# Patient Record
Sex: FEMALE | Race: WHITE | URBAN - METROPOLITAN AREA
[De-identification: names, ages, dates, MRNs, and addresses within clinical notes are randomized per-mention and may not be internally consistent; named-entity substitution may affect disease eponyms.]

---

## 2022-09-22 LAB
ABO, EXTERNAL RESULT: NORMAL
C. TRACHOMATIS, EXTERNAL RESULT: NEGATIVE
GBS, EXTERNAL RESULT: POSITIVE
HEP B, EXTERNAL RESULT: NEGATIVE
HIV, EXTERNAL RESULT: NORMAL
N. GONORRHOEAE, EXTERNAL RESULT: NEGATIVE
RH FACTOR, EXTERNAL RESULT: POSITIVE
RPR, EXTERNAL RESULT: NEGATIVE
RUBELLA TITER, EXTERNAL RESULT: NORMAL

## 2023-03-10 ENCOUNTER — ANESTHESIA (OUTPATIENT)
Dept: LABOR AND DELIVERY | Age: 28
End: 2023-03-10
Payer: COMMERCIAL

## 2023-03-10 ENCOUNTER — ANESTHESIA EVENT (OUTPATIENT)
Dept: LABOR AND DELIVERY | Age: 28
End: 2023-03-10
Payer: COMMERCIAL

## 2023-03-10 ENCOUNTER — HOSPITAL ENCOUNTER (INPATIENT)
Age: 28
LOS: 2 days | Discharge: HOME | End: 2023-03-12
Attending: OBSTETRICS & GYNECOLOGY | Admitting: OBSTETRICS & GYNECOLOGY
Payer: COMMERCIAL

## 2023-03-10 ENCOUNTER — APPOINTMENT (OUTPATIENT)
Dept: LABOR AND DELIVERY | Age: 28
End: 2023-03-10
Payer: COMMERCIAL

## 2023-03-10 PROBLEM — Z34.90 ENCOUNTER FOR ELECTIVE INDUCTION OF LABOR: Status: ACTIVE | Noted: 2023-03-10

## 2023-03-10 LAB
ABO/RH: NORMAL
AMPHETAMINE SCREEN, URINE: NORMAL
ANTIBODY SCREEN: NORMAL
BARBITURATE SCREEN URINE: NORMAL
BENZODIAZEPINE SCREEN, URINE: NORMAL
BUPRENORPHINE URINE: NORMAL
CANNABINOID SCREEN URINE: NORMAL
COCAINE METABOLITE SCREEN URINE: NORMAL
FENTANYL SCREEN, URINE: NORMAL
HCT VFR BLD CALC: 35.5 % (ref 36–48)
HEMOGLOBIN: 11.9 G/DL (ref 12–16)
Lab: NORMAL
MCH RBC QN AUTO: 28.5 PG (ref 26–34)
MCHC RBC AUTO-ENTMCNC: 33.5 G/DL (ref 31–36)
MCV RBC AUTO: 85 FL (ref 80–100)
METHADONE SCREEN, URINE: NORMAL
OPIATE SCREEN URINE: NORMAL
OXYCODONE URINE: NORMAL
PDW BLD-RTO: 13.9 % (ref 12.4–15.4)
PH UA: 7
PHENCYCLIDINE SCREEN URINE: NORMAL
PLATELET # BLD: 211 K/UL (ref 135–450)
PMV BLD AUTO: 11 FL (ref 5–10.5)
RBC # BLD: 4.17 M/UL (ref 4–5.2)
TOTAL SYPHILLIS IGG/IGM: NORMAL
WBC # BLD: 15.9 K/UL (ref 4–11)

## 2023-03-10 PROCEDURE — 6370000000 HC RX 637 (ALT 250 FOR IP): Performed by: OBSTETRICS & GYNECOLOGY

## 2023-03-10 PROCEDURE — 3E033VJ INTRODUCTION OF OTHER HORMONE INTO PERIPHERAL VEIN, PERCUTANEOUS APPROACH: ICD-10-PCS | Performed by: OBSTETRICS & GYNECOLOGY

## 2023-03-10 PROCEDURE — 7200000001 HC VAGINAL DELIVERY

## 2023-03-10 PROCEDURE — 59025 FETAL NON-STRESS TEST: CPT

## 2023-03-10 PROCEDURE — 86900 BLOOD TYPING SEROLOGIC ABO: CPT

## 2023-03-10 PROCEDURE — 80307 DRUG TEST PRSMV CHEM ANLYZR: CPT

## 2023-03-10 PROCEDURE — 0KQM0ZZ REPAIR PERINEUM MUSCLE, OPEN APPROACH: ICD-10-PCS | Performed by: OBSTETRICS & GYNECOLOGY

## 2023-03-10 PROCEDURE — 51701 INSERT BLADDER CATHETER: CPT

## 2023-03-10 PROCEDURE — 2500000003 HC RX 250 WO HCPCS: Performed by: NURSE ANESTHETIST, CERTIFIED REGISTERED

## 2023-03-10 PROCEDURE — 86780 TREPONEMA PALLIDUM: CPT

## 2023-03-10 PROCEDURE — 10907ZC DRAINAGE OF AMNIOTIC FLUID, THERAPEUTIC FROM PRODUCTS OF CONCEPTION, VIA NATURAL OR ARTIFICIAL OPENING: ICD-10-PCS | Performed by: OBSTETRICS & GYNECOLOGY

## 2023-03-10 PROCEDURE — 85027 COMPLETE CBC AUTOMATED: CPT

## 2023-03-10 PROCEDURE — 2500000003 HC RX 250 WO HCPCS

## 2023-03-10 PROCEDURE — 86901 BLOOD TYPING SEROLOGIC RH(D): CPT

## 2023-03-10 PROCEDURE — 6360000002 HC RX W HCPCS: Performed by: OBSTETRICS & GYNECOLOGY

## 2023-03-10 PROCEDURE — 3700000025 EPIDURAL BLOCK: Performed by: ANESTHESIOLOGY

## 2023-03-10 PROCEDURE — 1220000000 HC SEMI PRIVATE OB R&B

## 2023-03-10 PROCEDURE — 2580000003 HC RX 258: Performed by: OBSTETRICS & GYNECOLOGY

## 2023-03-10 PROCEDURE — 86850 RBC ANTIBODY SCREEN: CPT

## 2023-03-10 RX ORDER — SODIUM CHLORIDE, SODIUM LACTATE, POTASSIUM CHLORIDE, CALCIUM CHLORIDE 600; 310; 30; 20 MG/100ML; MG/100ML; MG/100ML; MG/100ML
INJECTION, SOLUTION INTRAVENOUS CONTINUOUS
Status: DISCONTINUED | OUTPATIENT
Start: 2023-03-10 | End: 2023-03-12 | Stop reason: HOSPADM

## 2023-03-10 RX ORDER — ACETAMINOPHEN 500 MG
1000 TABLET ORAL EVERY 8 HOURS PRN
Status: DISCONTINUED | OUTPATIENT
Start: 2023-03-10 | End: 2023-03-12 | Stop reason: HOSPADM

## 2023-03-10 RX ORDER — SODIUM CHLORIDE, SODIUM LACTATE, POTASSIUM CHLORIDE, AND CALCIUM CHLORIDE .6; .31; .03; .02 G/100ML; G/100ML; G/100ML; G/100ML
500 INJECTION, SOLUTION INTRAVENOUS PRN
Status: DISCONTINUED | OUTPATIENT
Start: 2023-03-10 | End: 2023-03-10 | Stop reason: HOSPADM

## 2023-03-10 RX ORDER — FERROUS SULFATE 325(65) MG
325 TABLET ORAL
COMMUNITY

## 2023-03-10 RX ORDER — DOCUSATE SODIUM 100 MG/1
100 CAPSULE, LIQUID FILLED ORAL 2 TIMES DAILY
Status: DISCONTINUED | OUTPATIENT
Start: 2023-03-10 | End: 2023-03-12 | Stop reason: HOSPADM

## 2023-03-10 RX ORDER — ONDANSETRON 2 MG/ML
4 INJECTION INTRAMUSCULAR; INTRAVENOUS EVERY 6 HOURS PRN
Status: DISCONTINUED | OUTPATIENT
Start: 2023-03-10 | End: 2023-03-10 | Stop reason: HOSPADM

## 2023-03-10 RX ORDER — IBUPROFEN 600 MG/1
600 TABLET ORAL EVERY 8 HOURS PRN
Status: DISCONTINUED | OUTPATIENT
Start: 2023-03-10 | End: 2023-03-12 | Stop reason: HOSPADM

## 2023-03-10 RX ORDER — LANOLIN 100 %
OINTMENT (GRAM) TOPICAL PRN
Status: DISCONTINUED | OUTPATIENT
Start: 2023-03-10 | End: 2023-03-12 | Stop reason: HOSPADM

## 2023-03-10 RX ORDER — SODIUM CHLORIDE 0.9 % (FLUSH) 0.9 %
5-40 SYRINGE (ML) INJECTION EVERY 12 HOURS SCHEDULED
Status: DISCONTINUED | OUTPATIENT
Start: 2023-03-10 | End: 2023-03-10 | Stop reason: HOSPADM

## 2023-03-10 RX ORDER — SODIUM CHLORIDE 0.9 % (FLUSH) 0.9 %
5-40 SYRINGE (ML) INJECTION PRN
Status: DISCONTINUED | OUTPATIENT
Start: 2023-03-10 | End: 2023-03-10 | Stop reason: HOSPADM

## 2023-03-10 RX ORDER — SODIUM CHLORIDE, SODIUM LACTATE, POTASSIUM CHLORIDE, AND CALCIUM CHLORIDE .6; .31; .03; .02 G/100ML; G/100ML; G/100ML; G/100ML
1000 INJECTION, SOLUTION INTRAVENOUS PRN
Status: DISCONTINUED | OUTPATIENT
Start: 2023-03-10 | End: 2023-03-10 | Stop reason: HOSPADM

## 2023-03-10 RX ORDER — MISOPROSTOL 200 UG/1
800 TABLET ORAL PRN
Status: DISCONTINUED | OUTPATIENT
Start: 2023-03-10 | End: 2023-03-10 | Stop reason: HOSPADM

## 2023-03-10 RX ORDER — SODIUM CHLORIDE 9 MG/ML
INJECTION, SOLUTION INTRAVENOUS PRN
Status: DISCONTINUED | OUTPATIENT
Start: 2023-03-10 | End: 2023-03-12 | Stop reason: HOSPADM

## 2023-03-10 RX ORDER — CARBOPROST TROMETHAMINE 250 UG/ML
250 INJECTION, SOLUTION INTRAMUSCULAR PRN
Status: DISCONTINUED | OUTPATIENT
Start: 2023-03-10 | End: 2023-03-10 | Stop reason: HOSPADM

## 2023-03-10 RX ORDER — ACETAMINOPHEN 325 MG/1
650 TABLET ORAL EVERY 4 HOURS PRN
Status: DISCONTINUED | OUTPATIENT
Start: 2023-03-10 | End: 2023-03-10 | Stop reason: SDUPTHER

## 2023-03-10 RX ORDER — DOCUSATE SODIUM 100 MG/1
100 CAPSULE, LIQUID FILLED ORAL 2 TIMES DAILY
Status: DISCONTINUED | OUTPATIENT
Start: 2023-03-10 | End: 2023-03-10

## 2023-03-10 RX ORDER — LIDOCAINE HYDROCHLORIDE AND EPINEPHRINE 15; 5 MG/ML; UG/ML
INJECTION, SOLUTION EPIDURAL PRN
Status: DISCONTINUED | OUTPATIENT
Start: 2023-03-10 | End: 2023-03-10 | Stop reason: SDUPTHER

## 2023-03-10 RX ORDER — TERBUTALINE SULFATE 1 MG/ML
0.25 INJECTION, SOLUTION SUBCUTANEOUS
Status: DISCONTINUED | OUTPATIENT
Start: 2023-03-10 | End: 2023-03-10 | Stop reason: HOSPADM

## 2023-03-10 RX ORDER — NALOXONE HYDROCHLORIDE 0.4 MG/ML
INJECTION, SOLUTION INTRAMUSCULAR; INTRAVENOUS; SUBCUTANEOUS PRN
Status: DISCONTINUED | OUTPATIENT
Start: 2023-03-10 | End: 2023-03-10 | Stop reason: HOSPADM

## 2023-03-10 RX ORDER — SODIUM CHLORIDE 0.9 % (FLUSH) 0.9 %
5-40 SYRINGE (ML) INJECTION EVERY 12 HOURS SCHEDULED
Status: DISCONTINUED | OUTPATIENT
Start: 2023-03-10 | End: 2023-03-12 | Stop reason: HOSPADM

## 2023-03-10 RX ORDER — OXYCODONE HYDROCHLORIDE 5 MG/1
5 TABLET ORAL EVERY 4 HOURS PRN
Status: DISCONTINUED | OUTPATIENT
Start: 2023-03-10 | End: 2023-03-12 | Stop reason: HOSPADM

## 2023-03-10 RX ORDER — SODIUM CHLORIDE 0.9 % (FLUSH) 0.9 %
5-40 SYRINGE (ML) INJECTION PRN
Status: DISCONTINUED | OUTPATIENT
Start: 2023-03-10 | End: 2023-03-12 | Stop reason: HOSPADM

## 2023-03-10 RX ORDER — SODIUM CHLORIDE 9 MG/ML
25 INJECTION, SOLUTION INTRAVENOUS PRN
Status: DISCONTINUED | OUTPATIENT
Start: 2023-03-10 | End: 2023-03-10 | Stop reason: HOSPADM

## 2023-03-10 RX ORDER — METHYLERGONOVINE MALEATE 0.2 MG/ML
200 INJECTION INTRAVENOUS PRN
Status: DISCONTINUED | OUTPATIENT
Start: 2023-03-10 | End: 2023-03-10 | Stop reason: HOSPADM

## 2023-03-10 RX ADMIN — DEXTROSE MONOHYDRATE 5 MILLION UNITS: 5 INJECTION INTRAVENOUS at 04:49

## 2023-03-10 RX ADMIN — Medication 10 UNITS: at 11:33

## 2023-03-10 RX ADMIN — IBUPROFEN 600 MG: 600 TABLET, FILM COATED ORAL at 14:11

## 2023-03-10 RX ADMIN — SODIUM CHLORIDE, POTASSIUM CHLORIDE, SODIUM LACTATE AND CALCIUM CHLORIDE: 600; 310; 30; 20 INJECTION, SOLUTION INTRAVENOUS at 09:16

## 2023-03-10 RX ADMIN — Medication 2.5 MILLION UNITS: at 08:52

## 2023-03-10 RX ADMIN — Medication 1 MILLI-UNITS/MIN: at 04:34

## 2023-03-10 RX ADMIN — LIDOCAINE HYDROCHLORIDE AND EPINEPHRINE 2 ML: 15; 5 INJECTION, SOLUTION EPIDURAL at 09:42

## 2023-03-10 RX ADMIN — IBUPROFEN 600 MG: 600 TABLET, FILM COATED ORAL at 23:39

## 2023-03-10 RX ADMIN — Medication 909.1 MILLI-UNITS/MIN: at 11:33

## 2023-03-10 RX ADMIN — LIDOCAINE HYDROCHLORIDE AND EPINEPHRINE 3 ML: 15; 5 INJECTION, SOLUTION EPIDURAL at 09:38

## 2023-03-10 RX ADMIN — DOCUSATE SODIUM 100 MG: 100 CAPSULE, LIQUID FILLED ORAL at 20:59

## 2023-03-10 ASSESSMENT — PAIN SCALES - GENERAL
PAINLEVEL_OUTOF10: 4
PAINLEVEL_OUTOF10: 0
PAINLEVEL_OUTOF10: 2

## 2023-03-10 ASSESSMENT — PAIN - FUNCTIONAL ASSESSMENT
PAIN_FUNCTIONAL_ASSESSMENT: ACTIVITIES ARE NOT PREVENTED
PAIN_FUNCTIONAL_ASSESSMENT: ACTIVITIES ARE NOT PREVENTED

## 2023-03-10 ASSESSMENT — ENCOUNTER SYMPTOMS: SHORTNESS OF BREATH: 0

## 2023-03-10 ASSESSMENT — PAIN DESCRIPTION - DESCRIPTORS
DESCRIPTORS: ACHING
DESCRIPTORS: CRAMPING

## 2023-03-10 ASSESSMENT — PAIN DESCRIPTION - ORIENTATION
ORIENTATION: LOWER
ORIENTATION: MID

## 2023-03-10 ASSESSMENT — PAIN DESCRIPTION - LOCATION
LOCATION: ABDOMEN
LOCATION: VAGINA

## 2023-03-10 NOTE — FLOWSHEET NOTE
Edmar Shields CRNA en route to bedside for epidural insertion. Pt able to ambulate to the restroom and empty bladder. Pt now is sitting up on edge of bed for insertion. IV fluid bolus is running. RN remains at bedside.

## 2023-03-10 NOTE — L&D DELIVERY SUMMARY NOTE
Department of Obstetrics and Gynecology  Spontaneous Vaginal Delivery Note      Pre-operative Diagnosis:  Term pregnancy and Induced labor    Post-operative Diagnosis:  Living  infant(s) and Male    Information for the patient's :  Mohsen Murillo [[id number]]                  Infant Wt:   Information for the patient's :  Isa Buerger [[id number]]         APGARS:     Information for the patient's :  Isa Buerger [[id number]]         Anesthesia:  epidural anesthesia    Application and Delivery:  27 yo  at 44 5/7 weeks, IOL at term. Progressed well to complete.  after 20 minutes pushing over small 2nd degree lac. Viable male, skin to skin, delayed cord clamp. Placenta spont intact, uterus explored. Repair with 3-0 vicryl.  ml        Intake/Output:     Date 23 - 03/10/23 0700(Not Admitted) 03/10/23 07 - 23 0700   Shift 8560-9092 5720-8374 24 Hour Total 0289-0367 0280-0751 24 Hour Total   INTAKE   I.V.  0.5 0.5 652.3  652.3   Shift Total  0.5 0.5 652.3  652.3   OUTPUT   Urine    725  725   Shift Total    725  725   NET  0.5 0.5 -72.7  -72.7       Condition:  infant stable to general nursery and mother stable    Blood Type and Rh: B POS        Rubella Immunity Status:   Unknown           Infant Feeding:    breast    Attending Attestation: I performed the procedure.

## 2023-03-10 NOTE — FLOWSHEET NOTE
RN at bedside to adjust External US monitor. Pt is requesting epidural at this time. Call placed to YEN Chapa CRNA to make aware. CRNA is in another room at this time and states that she will come to bedside when able. Pt able to ambulate to the restroom and voided. Rn remains at bedside. Pt is now standing at the edge of the bed and is rocking with contractions. External US adjusted multiple times but is not consistently tracing Fetal HR due to Pt movement. Rn audibly notes External US tracing maternal HR at 8436-3983. Rn adjusted External HR and notes FHR to be at a baseline of ~135 BPM. RN remains at bedside. Will continue to monitor.

## 2023-03-10 NOTE — FLOWSHEET NOTE
Head to toe assessment performed by this RN. VSS. Patient denies headache, blurred vision, dizziness, epigastric pain, nausea and vomiting or shortness of breath. Mild edema noted bilaterally in LE. Adequate urine output. whiteboard updated, oriented to room, Plan of care discussed with patient, questions and concerns addressed by this RN. RN to inform MD of assessment findings.

## 2023-03-10 NOTE — ANESTHESIA PROCEDURE NOTES
Epidural Block    Patient location during procedure: OB  Start time: 3/10/2023 9:30 AM  End time: 3/10/2023 9:54 AM  Reason for block: labor epidural  Staffing  Performed: resident/CRNA   Anesthesiologist: Jimmy Adamson MD  Resident/CRNA: CLIF Wright - CRNA  Epidural  Patient position: sitting  Prep: ChloraPrep and site prepped and draped  Patient monitoring: continuous pulse ox and frequent blood pressure checks  Approach: midline  Location: L3-4  Injection technique: CAMILLE saline  Provider prep: mask and sterile gloves  Needle  Needle type: Tuohy   Needle gauge: 17 G  Needle length: 3.5 in  Needle insertion depth: 8 cm  Catheter type: multi-orifice  Catheter size: 19 G  Catheter at skin depth: 13 cm  Test dose: negativeCatheter Secured: tegaderm and tape  Assessment  Sensory level: T10  Hemodynamics: stable  Attempts: 1  Outcomes: uncomplicated and patient tolerated procedure well  Additional Notes  Consent:  Risks and benefits of the labor epidural were discussed and the patient was given the opportunity to ask questions. Informed consent was obtained. Timeout:  A \"time out\" was performed immediately prior to the start of the epidural procedure. The patient, site, procedure and provider were correctly identified. Allergies were reviewed. All members of the team and the patient participated in the time out. Procedure  Skin wheal with Lidocaine 1% 2 mL @ L3-L4. Loss of resistance with 2 mL of normal saline to expand the epidural space. denies paresthesia. CSF  negative, Blood: negative. Test dose negative with (3ml 1. 5%Lidocaine with 1:200,000 Epinephrine)  Occlusive dressing; steri strips, tegaderm and tape applied using aseptic technique. Attempts: 1   Difficulties/Complications: None    The patient was returned to the supine position with her head of bed elevated 30 degrees and right uterine displacement. She tolerated the epidural placement and dosing well.  RN remains at bedside to monitor patient.       CLIF Lai CRNA  11:28 AM       Preanesthetic Checklist  Completed: patient identified, IV checked, site marked, risks and benefits discussed, surgical/procedural consents, equipment checked, pre-op evaluation, timeout performed, anesthesia consent given, oxygen available, monitors applied/VS acknowledged, fire risk safety assessment completed and verbalized and blood product R/B/A discussed and consented

## 2023-03-10 NOTE — PLAN OF CARE
Problem: Postpartum  Goal: Experiences normal postpartum course  Description:  Postpartum OB-Pregnancy care plan goal which identifies if the mother is experiencing a normal postpartum course  3/10/2023 135 by Guero Bermudez RN  Outcome: Progressing  3/10/2023 1352 by Guero Bermudez RN  Outcome: Progressing  Flowsheets (Taken 3/10/2023 0425 by Chika Nino RN)  Experiences Normal Postpartum Course:   Monitor maternal vital signs   Assess uterine involution  Goal: Appropriate maternal -  bonding  Description:  Postpartum OB-Pregnancy care plan goal which identifies if the mother and  are bonding appropriately  3/10/2023 135 by Guero Bermudez RN  Outcome: Progressing  3/10/2023 1352 by Guero Bermudez RN  Outcome: Progressing  Goal: Establishment of infant feeding pattern  Description:  Postpartum OB-Pregnancy care plan goal which identifies if the mother is establishing a feeding pattern with their   Outcome: Progressing  Flowsheets (Taken 3/10/2023 0425 by Chika Nino RN)  Establishment of Infant Feeding Pattern:   Assess breast/bottle feeding   Refer to lactation as needed  Goal: Incisions, wounds, or drain sites healing without S/S of infection  3/10/2023 1352 by Guero Bermudez RN  Outcome: Progressing  3/10/2023 1352 by Guero Bermudez RN  Outcome: Progressing  [address] (Taken 3/10/2023 0425 by Chika Nino RN)  Incisions, Wounds, or Drain Sites Healing Without Sign and Symptoms of Infection:   ADMISSION and DAILY: Assess and document risk factors for pressure ulcer development   TWICE DAILY: Assess and document skin integrity   TWICE DAILY: Assess and document dressing/incision, wound bed, drain sites and surrounding tissue     Problem: Pain  Goal: Verbalizes/displays adequate comfort level or baseline comfort level  3/10/2023 1352 by Guero Bermudez RN  Outcome: Progressing  3/10/2023 1352 by Guero Bermudez RN  Outcome: Progressing  [address] (Taken 3/10/2023 0425 by Everardo Carlton Shelby Antonio RN)  Verbalizes/displays adequate comfort level or baseline comfort level:   Encourage patient to monitor pain and request assistance   Assess pain using appropriate pain scale     Problem: Infection - Adult  Goal: Absence of infection at discharge  3/10/2023 1352 by Jayson Hardy RN  Outcome: Progressing  3/10/2023 1352 by Jayson Hardy RN  Outcome: Progressing  Flowsheets (Taken 3/10/2023 0425 by Nya Amanda RN)  Absence of infection at discharge:   Assess and monitor for signs and symptoms of infection   Monitor lab/diagnostic results   Monitor all insertion sites i.e., indwelling lines, tubes and drains  Goal: Absence of infection during hospitalization  3/10/2023 1352 by Jayson Hardy RN  Outcome: Progressing  3/10/2023 1352 by Jayson Hardy RN  Outcome: Progressing  Flowsheets (Taken 3/10/2023 0425 by Nya Amanda RN)  Absence of infection during hospitalization:   Assess and monitor for signs and symptoms of infection   Monitor lab/diagnostic results   Monitor all insertion sites i.e., indwelling lines, tubes and drains  Goal: Absence of fever/infection during anticipated neutropenic period  3/10/2023 1352 by Jayson Hardy RN  Outcome: Progressing  3/10/2023 1352 by Jayson Hardy RN  Outcome: Progressing  Flowsheets (Taken 3/10/2023 0425 by Nya Amanda RN)  Absence of fever/infection during anticipated neutropenic period:   Monitor white blood cell count   Administer growth factors as ordered     Problem: Safety - Adult  Goal: Free from fall injury  3/10/2023 1352 by Jayson Hardy RN  Outcome: Progressing  3/10/2023 1352 by Jayson Hardy RN  Outcome: Progressing     Problem: Discharge Planning  Goal: Discharge to home or other facility with appropriate resources  3/10/2023 1352 by Jayson Hardy RN  Outcome: Progressing  3/10/2023 1352 by Jayson Hardy RN  Outcome: Progressing  Flowsheets (Taken 3/10/2023 0425 by Nya Amanda RN)  Discharge to home or other facility with appropriate resources:   Identify barriers to discharge with patient and caregiver   Arrange for needed discharge resources and transportation as appropriate     Problem: Chronic Conditions and Co-morbidities  Goal: Patient's chronic conditions and co-morbidity symptoms are monitored and maintained or improved  3/10/2023 1352 by Aisha Maier RN  Outcome: Progressing  3/10/2023 1352 by Aisha Maier RN  Outcome: Progressing  Flowsheets (Taken 3/10/2023 0425 by Connie Jerome RN)  Care Plan - Patient's Chronic Conditions and Co-Morbidity Symptoms are Monitored and Maintained or Improved:   Monitor and assess patient's chronic conditions and comorbid symptoms for stability, deterioration, or improvement   Collaborate with multidisciplinary team to address chronic and comorbid conditions and prevent exacerbation or deterioration

## 2023-03-10 NOTE — FLOWSHEET NOTE
Pt admitted to [facility] for induction of labor. Pt and family oriented to room, call light, and binder. Whiteboard updated, gown and urine collection container given. Pt aware of urine drug testing and signed written consent. EFM applied with consent to central monitor bank with alarms on. Uterus soft and non-tender. Pt reports fetal movement. Pt denies vaginal leakage of fluid or bleeding. IV started, infusing w/o difficulty, labs sent. Admission history obtained and appropriate consents reviewed and signed.

## 2023-03-10 NOTE — H&P
Department of Obstetrics and Gynecology   Obstetrics History and Physical        CHIEF COMPLAINT:  induction    HISTORY OF PRESENT ILLNESS:    Emma Gastelum  is a 28 y.o.  female at 38w11d presents with a chief complaint as above and is being admitted for induction    Estimated Due Date: Estimated Date of Delivery: 3/12/23    PRENATAL CARE: Complicated by: none    PAST OB HISTORY:  OB History          1    Para        Term                AB        Living             SAB        IAB        Ectopic        Molar        Multiple        Live Births                  Past Medical History:        Diagnosis Date    Anemia      Past Surgical History:        Procedure Laterality Date    HAND SURGERY Right 2014    3 screws    TONSILLECTOMY  2008     Allergies:  Patient has no known allergies.   Social History:    Social History     Socioeconomic History    Marital status:      Spouse name: Not on file    Number of children: Not on file    Years of education: Not on file    Highest education level: Not on file   Occupational History    Not on file   Tobacco Use    Smoking status: Never    Smokeless tobacco: Never   Substance and Sexual Activity    Alcohol use: Not Currently    Drug use: Never    Sexual activity: Yes     Partners: Male   Other Topics Concern    Not on file   Social History Narrative    Not on file     Social Determinants of Health     Financial Resource Strain: Not on file   Food Insecurity: Not on file   Transportation Needs: Not on file   Physical Activity: Not on file   Stress: Not on file   Social Connections: Not on file   Intimate Partner Violence: Not on file   Housing Stability: Not on file     Family History:       Problem Relation Age of Onset    Breast Cancer Mother      Medications Prior to Admission:  Medications Prior to Admission: Prenatal Vit w/Yx-Spdqmwcst-YA (PNV PO), Take by mouth  ferrous sulfate (IRON 325) 325 (65 Fe) MG tablet, Take 325 mg by mouth daily (with breakfast)    REVIEW OF SYSTEMS:  negative     PHYSICAL EXAM:    Vitals:    03/10/23 0328 03/10/23 0545 03/10/23 0726   BP: (!) 142/89 115/75 109/69   Pulse: 94 75 80   Resp: 20 18 16   Temp: 97.6 °F (36.4 °C)  97.7 °F (36.5 °C)   TempSrc: Oral  Oral   SpO2: 97% 98% 98%   Weight: 229 lb (103.9 kg)     Height: 5' 9\" (1.753 m)       General appearance:  awake, alert, cooperative, no apparent distress, and appears stated age  Neurologic:  Awake, alert, oriented to name, place and time. Lungs:  No increased work of breathing, good air exchange  Abdomen:  Soft, non tender, gravid, fundal height consistent with the gestational age, EFW by Leopold's maneuvers is 7 lbs. ,  8 oz. Pelvis:  Adequate pelvis  Cervix: 1 cm  Contraction frequency: none  Membranes:  Intact  Labs: CBC:   Lab Results   Component Value Date/Time    WBC 15.9 03/10/2023 04:05 AM    RBC 4.17 03/10/2023 04:05 AM    HGB 11.9 03/10/2023 04:05 AM    HCT 35.5 03/10/2023 04:05 AM    MCV 85.0 03/10/2023 04:05 AM    MCH 28.5 03/10/2023 04:05 AM    MCHC 33.5 03/10/2023 04:05 AM    RDW 13.9 03/10/2023 04:05 AM     03/10/2023 04:05 AM    MPV 11.0 03/10/2023 04:05 AM       ASSESSMENT:  Encounter for elective induction of labor    PLAN: Admit, pitocin  Labor: Routine labor orders  Fetus: Reassuring  GBS: Positive    I have presented reasonable alternatives to the patient's proposed care, treatment, and services. The discussion I have done encompassed risks, benefits, and side effects related to the alternatives and the risks related to not receiving the proposed care, treatment, and services. All questions answered. Patient wishes to proceed. The surgical site was confirmed by the patient and me.       Electronically signed by Kaleb Elizalde MD on 3/10/2023 at 8:06 AM

## 2023-03-10 NOTE — FLOWSHEET NOTE
Dr. Gloria Wing at bedside to discuss POC with Pt. Pt is agreeable to AROM if possible. SVE performed per MD and AROM performed at 0716. A moderate amount of clear fluid was noted. Martina care performed and Pt assisted to the restroom. Pt denies any questions or needs at this time. Will continue to monitor.

## 2023-03-10 NOTE — ANESTHESIA PRE PROCEDURE
Department of Anesthesiology  Preprocedure Note       Name:  Codey La   Age:  28 y.o.  :  1995                                          MRN:  [id number]         Date:  3/10/2023      Procedure: Labor Epidural    Medications prior to admission:   Prior to Admission medications    Medication Sig Start Date End Date Taking?  Authorizing Provider   Prenatal Vit w/Jh-Dgqepfxkv-NR (PNV PO) Take by mouth   Yes Historical Provider, MD   ferrous sulfate (IRON 325) 325 (65 Fe) MG tablet Take 325 mg by mouth daily (with breakfast)   Yes Historical Provider, MD       Current medications:    Current Facility-Administered Medications   Medication Dose Route Frequency Provider Last Rate Last Admin    lactated ringers IV soln infusion   IntraVENous Continuous Vargas Diop MD        lactated ringers bolus  500 mL IntraVENous PRN Vargas Diop MD        Or    lactated ringers bolus  1,000 mL IntraVENous PRN Vargas Diop MD        sodium chloride flush 0.9 % injection 5-40 mL  5-40 mL IntraVENous 2 times per day Vargas Diop MD        sodium chloride flush 0.9 % injection 5-40 mL  5-40 mL IntraVENous PRN Vargas Diop MD        0.9 % sodium chloride infusion  25 mL IntraVENous PRN Vargas Diop MD        ondansetron [facility]) injection 4 mg  4 mg IntraVENous Q6H PRN Vargas Diop MD        methylergonovine (METHERGINE) injection 200 mcg  200 mcg IntraMUSCular PRN Vargas Diop MD        carboprost [facility]) injection 250 mcg  250 mcg IntraMUSCular PRN Vargas Diop MD        miSOPROStol (CYTOTEC) tablet 800 mcg  800 mcg Rectal PRN Vargas Diop MD        tranexamic acid (CYCLOKAPRON) 1000 mg in sodium chloride 0.9 % 50 mL IVPB  1,000 mg IntraVENous Once PRN Vargas Diop MD        acetaminophen (TYLENOL) tablet 650 mg  650 mg Oral Q4H PRN Vargas Diop MD        benzocaine-menthol (DERMOPLAST) 20-0.5 % spray   Topical PRN Eun Lo MD        docusate sodium (COLACE) capsule 100 mg  100 mg Oral BID Eun Lo MD        oxytocin (PITOCIN) 30 units in 500 mL infusion  1-20 miguelito-units/min IntraVENous Continuous Eun Lo MD 8 mL/hr at 03/10/23 0902 8 miguelito-units/min at 03/10/23 0902    terbutaline (BRETHINE) injection 0.25 mg  0.25 mg SubCUTAneous Once PRN Eun Lo MD        witch hazel-glycerin (TUCKS) pad   Topical PRN Eun Lo MD        penicillin G potassium 2.5 million units in 0.9% sodium chloride 100 mL IVPB  2.5 Million Units IntraVENous Q4H Eun Lo MD   2.5 Million Units at 03/10/23 3401       Allergies:  No Known Allergies    Problem List:    Patient Active Problem List   Diagnosis Code    Encounter for elective induction of labor Z34.90       Past Medical History:        Diagnosis Date    Anemia        Past Surgical History:        Procedure Laterality Date    HAND SURGERY Right 2014    3 screws    TONSILLECTOMY  2008       Social History:    Social History     Tobacco Use    Smoking status: Never    Smokeless tobacco: Never   Substance Use Topics    Alcohol use: Not Currently                                Counseling given: Not Answered      Vital Signs (Current):   Vitals:    03/10/23 0328 03/10/23 0545 03/10/23 0726 03/10/23 0908   BP: (!) 142/89 115/75 109/69 (!) 155/82   Pulse: 94 75 80 97   Resp: 20 18 16 24   Temp: 36.4 °C (97.6 °F)  36.5 °C (97.7 °F) 36.7 °C (98.1 °F)   TempSrc: Oral  Oral Axillary   SpO2: 97% 98% 98%    Weight: 229 lb (103.9 kg)      Height: 5' 9\" (1.753 m)                                                 BP Readings from Last 3 Encounters:   03/10/23 (!) 155/82       NPO Status:                                                                                 BMI:   Wt Readings from Last 3 Encounters:   03/10/23 229 lb (103.9 kg)     Body mass index is 33.82 kg/m².    CBC:   Lab Results   Component Value Date/Time    WBC 15.9 03/10/2023 04:05 AM    RBC 4.17 03/10/2023 04:05 AM    HGB 11.9 03/10/2023 04:05 AM    HCT 35.5 03/10/2023 04:05 AM    MCV 85.0 03/10/2023 04:05 AM    RDW 13.9 03/10/2023 04:05 AM     03/10/2023 04:05 AM       Anesthesia Evaluation  Patient summary reviewed and Nursing notes reviewed no history of anesthetic complications:   Airway: Mallampati: II  TM distance: >3 FB   Neck ROM: full  Mouth opening: > = 3 FB   Dental: normal exam         Pulmonary:Negative Pulmonary ROS and normal exam  breath sounds clear to auscultation      (-) asthma, shortness of breath and recent URI                           Cardiovascular:Negative CV ROS  Exercise tolerance: good (>4 METS),       (-) hypertension and CAD      Rhythm: regular  Rate: normal                    Neuro/Psych:   Negative Neuro/Psych ROS              GI/Hepatic/Renal: Neg GI/Hepatic/Renal ROS       (-) GERD, liver disease and no renal disease       Endo/Other: Negative Endo/Other ROS   (+) blood dyscrasia: anemia:., .    (-) diabetes mellitus               Abdominal:             Vascular: negative vascular ROS.    - DVT and PE.      Other Findings:           Anesthesia Plan      epidural     ASA 2     (Plan for a labor epidural. Plan and risks discussed with patient including but not limited to changes in HR, B/P and oxygen status; N/V; infection; headache; inadequate block or need to replace epidural. Questions answered. Patient agrees to POC.     )        Anesthetic plan and risks discussed with patient and spouse.        Attending anesthesiologist reviewed and agrees with Preprocedure content          OB History        1    Para        Term                AB        Living           SAB        IAB        Ectopic        Molar        Multiple        Live Births                    Lydia Cheney is a 27 y.o. year-old female admitted to [facility] MD for elective IOL.   Gestational age is 39w5d. Her Body mass index is 33.82 kg/m².     She was seen, examined and her chart was reviewed (including anesthesia, drug and allergy history).  No interval changes are noted to her history and physical examination. (except as noted above).    Risks/benefits/alternatives of both neuraxial and general anesthesia were discussed and she agrees to proceed at the direction of the care team.    CLIF WATERS CRNA  March 10, 2023  9:16 AM        CLIF WATERS CRNA   3/10/2023

## 2023-03-10 NOTE — FLOWSHEET NOTE
Recovery end at 1406. No complications noted. Bleeding WNL, fundus firm and midline, and all VSS. Pt ambulated to restroom but was unable to void at that time. Pt educated on proper perineal comfort and care. Gabriela care performed and gown and gabriela pads changed. Pt transferred with all belongings, FOB, and infant to postpartum room [id number]. Pt oriented to room, call light, and plan of care. All questions answered at this time, Pt denies any additional needs. Will continue to monitor.

## 2023-03-10 NOTE — FLOWSHEET NOTE
Pt called RN to make aware that she was able to void fully on her own without any complications. Will continue to monitor.

## 2023-03-10 NOTE — ANESTHESIA POSTPROCEDURE EVALUATION
Department of Anesthesiology  Postprocedure Note    Patient: Marc Mendez  MRN: [id number]  YOB: 1995  Date of evaluation: 3/10/2023      Procedure Summary     Date: 03/10/23 Room / Location:     Anesthesia Start: 0921 Anesthesia Stop: 1128    Procedure: Labor Analgesia Diagnosis:     Scheduled Providers:  Responsible Provider: Gabe Winston MD    Anesthesia Type: epidural ASA Status: 2          Anesthesia Type: Epidural    Armin Phase I: Armin Score: 9    Armin Phase II:        Anesthesia Post Evaluation    Patient location during evaluation: bedside  Patient participation: complete - patient participated  Level of consciousness: awake and alert  Pain score: 0  Airway patency: patent  Nausea & Vomiting: no nausea and no vomiting  Complications: no  Cardiovascular status: hemodynamically stable  Respiratory status: room air, spontaneous ventilation and acceptable  Hydration status: stable  Multimodal analgesia pain management approach    /78   Pulse (!) 109   Temp 36.6 °C (97.9 °F)   Resp 16   Ht 5' 9\" (1.753 m)   Wt 229 lb (103.9 kg)   SpO2 98%   Breastfeeding Unknown   BMI 33.82 kg/m²

## 2023-03-10 NOTE — FLOWSHEET NOTE
03/10/23 1030   Fetal Heart Rate   Mode External US   Baseline Rate 135 bpm   Baseline Classification Normal   Variability 6-25 BPM   Pattern Accelerations;Early decelerations   Patient Feels Fetal Movement Yes   Fetal Monitoring Strip   FMS Reviewed? Yes   FMS Reviewed By? MPH,RN   Uterine Activity   UA Mode Palpation; [place]   Contraction Frequency 2-3   Contraction Duration 60-90   Contraction Intensity Moderate   Resting Tone Palpated Soft

## 2023-03-10 NOTE — FLOWSHEET NOTE
of viable male infant at 12. Infant dried and stimulated. Spontaneous cry noted. HR greater than 100 bpm, Tone WNL, skin color pink. Cord clamping delayed, then clamped and cut. Hat and diaper placed. Infant placed skin to skin with mother at 36.

## 2023-03-11 PROCEDURE — 1220000000 HC SEMI PRIVATE OB R&B

## 2023-03-11 PROCEDURE — 6370000000 HC RX 637 (ALT 250 FOR IP): Performed by: OBSTETRICS & GYNECOLOGY

## 2023-03-11 RX ADMIN — IBUPROFEN 600 MG: 600 TABLET, FILM COATED ORAL at 18:41

## 2023-03-11 RX ADMIN — DOCUSATE SODIUM 100 MG: 100 CAPSULE, LIQUID FILLED ORAL at 21:19

## 2023-03-11 RX ADMIN — ACETAMINOPHEN 1000 MG: 500 TABLET ORAL at 23:26

## 2023-03-11 RX ADMIN — DOCUSATE SODIUM 100 MG: 100 CAPSULE, LIQUID FILLED ORAL at 08:46

## 2023-03-11 RX ADMIN — IBUPROFEN 600 MG: 600 TABLET, FILM COATED ORAL at 07:54

## 2023-03-11 ASSESSMENT — PAIN DESCRIPTION - FREQUENCY: FREQUENCY: INTERMITTENT

## 2023-03-11 ASSESSMENT — PAIN DESCRIPTION - LOCATION
LOCATION: ABDOMEN
LOCATION: ABDOMEN

## 2023-03-11 ASSESSMENT — PAIN SCALES - GENERAL
PAINLEVEL_OUTOF10: 3
PAINLEVEL_OUTOF10: 3
PAINLEVEL_OUTOF10: 2
PAINLEVEL_OUTOF10: 4

## 2023-03-11 ASSESSMENT — PAIN DESCRIPTION - PAIN TYPE: TYPE: ACUTE PAIN

## 2023-03-11 ASSESSMENT — PAIN DESCRIPTION - DESCRIPTORS
DESCRIPTORS: CRAMPING
DESCRIPTORS: CRAMPING

## 2023-03-11 ASSESSMENT — PAIN DESCRIPTION - ONSET: ONSET: ON-GOING

## 2023-03-11 ASSESSMENT — PAIN DESCRIPTION - ORIENTATION: ORIENTATION: LOWER

## 2023-03-11 NOTE — DISCHARGE INSTRUCTIONS
Department of Obstetrics and Gynecology  Vaginal Delivery Postpartum Discharge instructions    You will need a postpartum visit in the clinic 6 weeks after your delivery. Please call office [phone] to schedule an appointment:      Please call the office or the OB/GYN on-call if after-hours for any of the followin) Fever - a temperature greater than 100.4  2) Uncontrolled pain  3) Uncontrolled bleeding (soaking more than 1 pad in an hour)  4) Foul-smelling discharge from the vagina    Do not place anything in the vagina - this includes tampons, douches or having sex - until after your 6 week postpartum visit to prevent infection. Medications:   acetaminophen (TYLENOL) tablet 1,000 mg    Admin Instructions:    Give in addition to any other pain medication ordered at same time for any pain indication. Maximum dose of acetaminophen is 4000mg from all sources in 24 hours. Alternate ibuprofen and acetaminophen every 4 hours. 2 tablet (2 × 500 mg tablet) Oral EVERY 8 HOURS PRN 23 0819    ibuprofen (ADVIL;MOTRIN) tablet 600 mg    Admin Instructions:    Give in addition to any other pain medication ordered at same time for any pain indication. Once tolerating PO, discontinue Toradol and begin ibuprofen 8 hours after the final dose of Toradol. Alternate ibuprofen and acetaminophen every 4 hours.  Postpartum    1 tablet (1 × 600 mg tablet) Oral EVERY 8 HOURS PRN 23 [id number]

## 2023-03-11 NOTE — PLAN OF CARE
Problem: Postpartum  Goal: Experiences normal postpartum course  Description:  Postpartum OB-Pregnancy care plan goal which identifies if the mother is experiencing a normal postpartum course  3/11/2023 1720 by Kiana Vallecillo RN  Outcome: Progressing  Flowsheets (Taken 3/11/2023 0800 by Ruben Bose RN)  Experiences Normal Postpartum Course:   Monitor maternal vital signs   Assess uterine involution  3/11/2023 0353 by Karlos Haile RN  Outcome: Progressing  Goal: Appropriate maternal -  bonding  Description:  Postpartum OB-Pregnancy care plan goal which identifies if the mother and  are bonding appropriately  3/11/2023 1720 by Kiana Vallecillo RN  Outcome: Progressing  3/11/2023 0353 by Karlos Haile RN  Outcome: Progressing  Goal: Establishment of infant feeding pattern  Description:  Postpartum OB-Pregnancy care plan goal which identifies if the mother is establishing a feeding pattern with their   3/11/2023 1720 by Kiana Vallecillo RN  Outcome: Progressing  Flowsheets (Taken 3/11/2023 0800 by Ruben Bose RN)  Establishment of Infant Feeding Pattern: Assess breast/bottle feeding  3/11/2023 0353 by Karlos Haile RN  Outcome: Progressing  Goal: Incisions, wounds, or drain sites healing without S/S of infection  Outcome: Progressing     Problem: Pain  Goal: Verbalizes/displays adequate comfort level or baseline comfort level  3/11/2023 1720 by Kiana Vallecillo RN  Outcome: Progressing  Flowsheets (Taken 3/11/2023 0800 by Ruben Bose RN)  Verbalizes/displays adequate comfort level or baseline comfort level:   Encourage patient to monitor pain and request assistance   Assess pain using appropriate pain scale   Administer analgesics based on type and severity of pain and evaluate response   Implement non-pharmacological measures as appropriate and evaluate response   Consider cultural and social influences on pain and pain management   Notify Licensed Independent Practitioner if interventions unsuccessful or patient reports new pain  3/11/2023 0353 by Romulo Sheikh RN  Outcome: Progressing     Problem: Infection - Adult  Goal: Absence of infection at discharge  Outcome: Progressing  Flowsheets (Taken 3/11/2023 0800 by Gracie Dash, RN)  Absence of infection at discharge:   Assess and monitor for signs and symptoms of infection   Monitor lab/diagnostic results   Monitor all insertion sites i.e., indwelling lines, tubes and drains   [place] appropriate cooling/warming therapies per order   Administer medications as ordered   Instruct and encourage patient and family to use good hand hygiene technique   Identify and instruct in appropriate isolation precautions for identified infection/condition  Goal: Absence of infection during hospitalization  3/11/2023 1720 by Carmen Wagner RN  Outcome: Progressing  Flowsheets (Taken 3/11/2023 0800 by Gracie Dash, RN)  Absence of infection during hospitalization:   Assess and monitor for signs and symptoms of infection   Monitor lab/diagnostic results   Monitor all insertion sites i.e., indwelling lines, tubes and drains   [place] appropriate cooling/warming therapies per order   Administer medications as ordered   Instruct and encourage patient and family to use good hand hygiene technique   Identify and instruct in appropriate isolation precautions for identified infection/condition  3/11/2023 0353 by Romulo Sheikh RN  Outcome: Progressing  Goal: Absence of fever/infection during anticipated neutropenic period  Outcome: Progressing  Flowsheets (Taken 3/11/2023 0800 by Gracie Dash, NISH)  Absence of fever/infection during anticipated neutropenic period:   Monitor white blood cell count   Implement neutropenic guidelines     Problem: Safety - Adult  Goal: Free from fall injury  3/11/2023 1720 by Carmen Wagner RN  Outcome: Progressing  3/11/2023 0353 by Romulo Sheikh RN  Outcome: Progressing     Problem: Discharge Planning  Goal: Discharge to home or other facility with appropriate resources  Outcome: Progressing     Problem: Chronic Conditions and Co-morbidities  Goal: Patient's chronic conditions and co-morbidity symptoms are monitored and maintained or improved  Outcome: Progressing

## 2023-03-11 NOTE — ANESTHESIA POSTPROCEDURE EVALUATION
Department of Anesthesiology  Postprocedure Note    Patient: Darylene Marks  MRN: [id number]  YOB: 1995  Date of evaluation: 3/11/2023      Procedure Summary     Date: 03/10/23 Room / Location:     Anesthesia Start: 0921 Anesthesia Stop: 1128    Procedure: Labor Analgesia Diagnosis:     Scheduled Providers:  Responsible Provider: Justyn Spaulding MD    Anesthesia Type: epidural ASA Status: 2          Anesthesia Type: No value filed.     Armin Phase I: Armin Score: 9    Armin Phase II: Armin Score: 10      Anesthesia Post Evaluation    Patient location during evaluation: floor  Patient participation: complete - patient participated  Level of consciousness: awake and alert  Pain score: 2  Airway patency: patent  Nausea & Vomiting: no vomiting and no nausea  Complications: no  Cardiovascular status: hemodynamically stable  Respiratory status: room air, spontaneous ventilation and acceptable  Hydration status: stable

## 2023-03-11 NOTE — PROGRESS NOTES
Department of Obstetrics and Gynecology  Vaginal Delivery Postpartum Rounds    SUBJECTIVE:  Pain is controlled with Tylenol or non-steroidal anti-inflammatory drugs. Her lochia is normal.    OBJECTIVE:  Vital Signs: /86   Pulse 86   Temp 97.5 °F (36.4 °C) (Oral)   Resp 18   Ht 5' 9\" (1.753 m)   Wt 229 lb (103.9 kg)   SpO2 98%   Breastfeeding Unknown   BMI 33.82 kg/m²   Appearance/Psychiatric: awake, alert, cooperative, no apparent distress, appears stated age  Constitutional: The patient is well nourished. Cardiovascular: She does not have edema. Respiratory: Respiratory effort is normal.  Gastrointestinal: Soft, non tender, uterine fundus is firm below umbilicus  Extremities: nontender to palpation  Perineum: intact     LABS / IMAGING:    Lab Results   Component Value Date/Time    WBC 15.9 03/10/2023 04:05 AM    RBC 4.17 03/10/2023 04:05 AM    HGB 11.9 03/10/2023 04:05 AM    HCT 35.5 03/10/2023 04:05 AM    MCV 85.0 03/10/2023 04:05 AM    MCH 28.5 03/10/2023 04:05 AM    MCHC 33.5 03/10/2023 04:05 AM    RDW 13.9 03/10/2023 04:05 AM     03/10/2023 04:05 AM    MPV 11.0 03/10/2023 04:05 AM     ASSESSMENT:    Postpartum Day 1 s/p     PLAN:   1. Continue routine postpartum care   2. Discharge home on Postpartum Day 1  3. Return to office in 6 weeks   4.  Postpartum instructions reviewed and all patient's Questions answered    Electronically signed by Oanh Sanon MD on 3/11/2023 at 6:59 AM

## 2023-03-11 NOTE — FLOWSHEET NOTE
Pt states she would like to breast & bottle feed. Pt also asks what to do with her breasts if she decides to exclusively bottle feed. Instructed to wear tight bra until milk dries up and use ice packs for pain/swelling. Pt verbalized understanding.

## 2023-03-11 NOTE — DISCHARGE SUMMARY
Department of Obstetrics and Gynecology  Postpartum Discharge Summary      Admit Date: 3/10/2023    Admit Diagnosis: Encounter for elective induction of labor [Z34.90]    Discharge Date:   Any delay in discharge from ordered D/C date due to  factors. Discharge Diagnoses: spontaneous vaginal delivery       Medication List        CONTINUE taking these medications      ferrous sulfate 325 (65 Fe) MG tablet  Commonly known as: IRON 325     PNV PO              Service: Obstetrics    Consults: none    Significant Diagnostic Studies: none    Postpartum complications: none     Condition at Discharge: good    Hospital Course: uncomplicated    Discharge Instructions: Activity: as tolerated    Diet: regular diet    Instructions: No intercourse and nothing in the vagina for 6 weeks. Do not drive while using pain medications.  Keep any wounds clean and dry    Discharge to: Home    Disposition / Follow up: Return to office in 6 weeks    Home Health Nurse visit within 24-48 h if qualifies     Data:  Apgars:  Information for the patient's :  Esperanza Cansecoress [[id number]]   APGAR One: 9   Information for the patient's :  Esperanza Delarosa [[id number]]   APGAR Five: 9   Birth Weight:  Information for the patient's :  Esperanza Cansecoress [[id number]]   Birth Weight: 8 lb 5 oz (3.77 kg)   Home with mother    Electronically signed by Thao Shine MD on 3/11/2023 at 7:01 AM

## 2023-03-11 NOTE — PLAN OF CARE
Problem: Postpartum  Goal: Experiences normal postpartum course  Description:  Postpartum OB-Pregnancy care plan goal which identifies if the mother is experiencing a normal postpartum course  Outcome: Progressing  Goal: Appropriate maternal -  bonding  Description:  Postpartum OB-Pregnancy care plan goal which identifies if the mother and  are bonding appropriately  Outcome: Progressing  Goal: Establishment of infant feeding pattern  Description:  Postpartum OB-Pregnancy care plan goal which identifies if the mother is establishing a feeding pattern with their   Outcome: Progressing     Problem: Pain  Goal: Verbalizes/displays adequate comfort level or baseline comfort level  Outcome: Progressing     Problem: Infection - Adult  Goal: Absence of infection during hospitalization  Outcome: Progressing     Problem: Safety - Adult  Goal: Free from fall injury  Outcome: Progressing

## 2023-03-12 VITALS
WEIGHT: 229 LBS | TEMPERATURE: 97.6 F | HEIGHT: 69 IN | HEART RATE: 83 BPM | RESPIRATION RATE: 18 BRPM | OXYGEN SATURATION: 99 % | BODY MASS INDEX: 33.92 KG/M2 | SYSTOLIC BLOOD PRESSURE: 118 MMHG | DIASTOLIC BLOOD PRESSURE: 77 MMHG

## 2023-03-12 PROCEDURE — 6370000000 HC RX 637 (ALT 250 FOR IP): Performed by: OBSTETRICS & GYNECOLOGY

## 2023-03-12 RX ADMIN — ACETAMINOPHEN 1000 MG: 500 TABLET ORAL at 08:19

## 2023-03-12 RX ADMIN — BENZOCAINE AND LEVOMENTHOL: 200; 5 SPRAY TOPICAL at 08:18

## 2023-03-12 RX ADMIN — DOCUSATE SODIUM 100 MG: 100 CAPSULE, LIQUID FILLED ORAL at 08:19

## 2023-03-12 ASSESSMENT — PAIN - FUNCTIONAL ASSESSMENT
PAIN_FUNCTIONAL_ASSESSMENT: ACTIVITIES ARE NOT PREVENTED

## 2023-03-12 ASSESSMENT — PAIN DESCRIPTION - PAIN TYPE
TYPE: ACUTE PAIN

## 2023-03-12 ASSESSMENT — PAIN DESCRIPTION - DESCRIPTORS
DESCRIPTORS: CRAMPING
DESCRIPTORS: ACHING
DESCRIPTORS: ACHING

## 2023-03-12 ASSESSMENT — PAIN DESCRIPTION - ORIENTATION
ORIENTATION: LOWER

## 2023-03-12 ASSESSMENT — PAIN DESCRIPTION - ONSET
ONSET: GRADUAL

## 2023-03-12 ASSESSMENT — PAIN DESCRIPTION - FREQUENCY
FREQUENCY: INTERMITTENT

## 2023-03-12 ASSESSMENT — PAIN DESCRIPTION - LOCATION
LOCATION: ABDOMEN

## 2023-03-12 ASSESSMENT — PAIN SCALES - GENERAL
PAINLEVEL_OUTOF10: 3
PAINLEVEL_OUTOF10: 1
PAINLEVEL_OUTOF10: 1

## 2023-03-12 NOTE — PLAN OF CARE
Problem: Postpartum  Goal: Experiences normal postpartum course  Description:  Postpartum OB-Pregnancy care plan goal which identifies if the mother is experiencing a normal postpartum course  3/12/2023 06 by Guido Bonilla RN  Outcome: Progressing  3/11/2023 172 by Maria L Valle RN  Outcome: Progressing  Flowsheets (Taken 3/11/2023 08 by Belle Rivera, NISH)  Experiences Normal Postpartum Course:   Monitor maternal vital signs   Assess uterine involution  Goal: Appropriate maternal -  bonding  Description:  Postpartum OB-Pregnancy care plan goal which identifies if the mother and  are bonding appropriately  3/12/2023 06 by Guido Bonilla RN  Outcome: Progressing  3/11/2023 1720 by Maria L Valle RN  Outcome: Progressing  Goal: Establishment of infant feeding pattern  Description:  Postpartum OB-Pregnancy care plan goal which identifies if the mother is establishing a feeding pattern with their   3/12/2023 4978 by Guido Bonilla RN  Outcome: Progressing  3/11/2023 172 by Maria L Valle RN  Outcome: Progressing  Flowsheets (Taken 3/11/2023 08 by Belle Rivera RN)  Establishment of Infant Feeding Pattern: Assess breast/bottle feeding  Goal: Incisions, wounds, or drain sites healing without S/S of infection  3/12/2023 8971 by Guido Bonilla RN  Outcome: Progressing  3/11/2023 1720 by Maria L Valle RN  Outcome: Progressing     Problem: Pain  Goal: Verbalizes/displays adequate comfort level or baseline comfort level  3/12/2023 0613 by Guido Bonilla RN  Outcome: Progressing  3/11/2023 1720 by Maria L Valle RN  Outcome: Progressing  Flowsheets (Taken 3/11/2023 08 by Belle Rivera RN)  Verbalizes/displays adequate comfort level or baseline comfort level:   Encourage patient to monitor pain and request assistance   Assess pain using appropriate pain scale   Administer analgesics based on type and severity of pain and evaluate response   Implement non-pharmacological measures as appropriate and evaluate response   Consider cultural and social influences on pain and pain management   Notify Licensed Independent Practitioner if interventions unsuccessful or patient reports new pain     Problem: Infection - Adult  Goal: Absence of infection at discharge  3/12/2023 0613 by Quin Douglas RN  Outcome: Progressing  3/11/2023 1720 by Alirio Carter RN  Outcome: Progressing  Flowsheets (Taken 3/11/2023 0800 by Isha Chanel RN)  Absence of infection at discharge:   Assess and monitor for signs and symptoms of infection   Monitor lab/diagnostic results   Monitor all insertion sites i.e., indwelling lines, tubes and drains   [place] appropriate cooling/warming therapies per order   Administer medications as ordered   Instruct and encourage patient and family to use good hand hygiene technique   Identify and instruct in appropriate isolation precautions for identified infection/condition  Goal: Absence of infection during hospitalization  3/12/2023 0613 by Quin Douglas RN  Outcome: Progressing  3/11/2023 1720 by Alirio Carter RN  Outcome: Progressing  Flowsheets (Taken 3/11/2023 0800 by Isha Chanel RN)  Absence of infection during hospitalization:   Assess and monitor for signs and symptoms of infection   Monitor lab/diagnostic results   Monitor all insertion sites i.e., indwelling lines, tubes and drains   [place] appropriate cooling/warming therapies per order   Administer medications as ordered   Instruct and encourage patient and family to use good hand hygiene technique   Identify and instruct in appropriate isolation precautions for identified infection/condition  Goal: Absence of fever/infection during anticipated neutropenic period  3/12/2023 0613 by Quin Douglas RN  Outcome: Progressing  3/11/2023 1720 by Alirio Carter RN  Outcome: Progressing  Flowsheets (Taken 3/11/2023 0800 by Isha Chanel RN)  Absence of fever/infection during anticipated neutropenic period:   Monitor white blood cell count Implement neutropenic guidelines     Problem: Safety - Adult  Goal: Free from fall injury  3/12/2023 9321 by Bethany Vigil RN  Outcome: Progressing  3/11/2023 1720 by Rhys Payan RN  Outcome: Progressing     Problem: Discharge Planning  Goal: Discharge to home or other facility with appropriate resources  3/12/2023 0613 by Bethany Vigil RN  Outcome: Progressing  3/11/2023 1720 by Rhys Payan RN  Outcome: Progressing     Problem: Chronic Conditions and Co-morbidities  Goal: Patient's chronic conditions and co-morbidity symptoms are monitored and maintained or improved  3/12/2023 0613 by Bethany Vigil RN  Outcome: Progressing  3/11/2023 1720 by Rhys Payan RN  Outcome: Progressing

## 2023-03-12 NOTE — FLOWSHEET NOTE
Postpartum and infant care teaching completed and forms signed by patient. Copy witnessed by RN and given to patient. Patient verbalized understanding of all teaching points. Patient plans to follow-up with [facility] Provider as instructed. Patient verbalizes understanding of discharge instructions and denies further questions. ID bands checked. Mother's ID band and one of baby's ID bands removed and taped to footprint sheet, signed by patient and witnessed by RN. Patient discharged in stable condition accompanied by family/guardian.

## 2023-03-12 NOTE — PLAN OF CARE
Problem: Postpartum  Goal: Experiences normal postpartum course  Description:  Postpartum OB-Pregnancy care plan goal which identifies if the mother is experiencing a normal postpartum course  3/12/2023 06 by Shakila Flood RN  Outcome: Progressing  Goal: Appropriate maternal -  bonding  Description:  Postpartum OB-Pregnancy care plan goal which identifies if the mother and  are bonding appropriately  3/12/2023 0613 by Shakila Flood RN  Outcome: Progressing  Goal: Establishment of infant feeding pattern  Description:  Postpartum OB-Pregnancy care plan goal which identifies if the mother is establishing a feeding pattern with their   3/12/2023 0565 by Shakila Flood RN  Outcome: Progressing  Goal: Incisions, wounds, or drain sites healing without S/S of infection  3/12/2023 8512 by Shakila Flood RN  Outcome: Progressing     Problem: Pain  Goal: Verbalizes/displays adequate comfort level or baseline comfort level  3/12/2023 0613 by Shakila Flood RN  Outcome: Progressing     Problem: Infection - Adult  Goal: Absence of infection at discharge  3/12/2023 7111 by Shakila Flood RN  Outcome: Progressing  Goal: Absence of infection during hospitalization  3/12/2023 2651 by Shakila Flood RN  Outcome: Progressing  Goal: Absence of fever/infection during anticipated neutropenic period  3/12/2023 6761 by Shakila Flood RN  Outcome: Progressing     Problem: Safety - Adult  Goal: Free from fall injury  3/12/2023 0613 by Shakila Flood RN  Outcome: Progressing     Problem: Discharge Planning  Goal: Discharge to home or other facility with appropriate resources  3/12/2023 5184 by Shakila Flood RN  Outcome: Progressing     Problem: Chronic Conditions and Co-morbidities  Goal: Patient's chronic conditions and co-morbidity symptoms are monitored and maintained or improved  3/12/2023 0613 by Shakila Flood RN  Outcome: Progressing

## 2023-03-12 NOTE — PLAN OF CARE
Problem: Postpartum  Goal: Experiences normal postpartum course  Description:  Postpartum OB-Pregnancy care plan goal which identifies if the mother is experiencing a normal postpartum course  3/12/2023 0838 by Nathan Gao RN  Outcome: Completed  3/12/2023 2628 by Noemi Bradford RN  Outcome: Progressing  Goal: Appropriate maternal -  bonding  Description:  Postpartum OB-Pregnancy care plan goal which identifies if the mother and  are bonding appropriately  3/12/2023 0838 by Nathan Gao RN  Outcome: Completed  3/12/2023 6654 by Noemi Bradford RN  Outcome: Progressing  Goal: Establishment of infant feeding pattern  Description:  Postpartum OB-Pregnancy care plan goal which identifies if the mother is establishing a feeding pattern with their   3/12/2023 1698 by Nathan Gao RN  Outcome: Completed  3/12/2023 8934 by Noemi Bradford RN  Outcome: Progressing  Goal: Incisions, wounds, or drain sites healing without S/S of infection  3/12/2023 08 by Nathan Gao RN  Outcome: Completed  3/12/2023 0613 by Noemi Bradford RN  Outcome: Progressing     Problem: Pain  Goal: Verbalizes/displays adequate comfort level or baseline comfort level  3/12/2023 0838 by Nathan Gao RN  Outcome: Completed  3/12/2023 0613 by Noemi Bradford RN  Outcome: Progressing     Problem: Infection - Adult  Goal: Absence of infection at discharge  3/12/2023 08 by Nathan Gao RN  Outcome: Completed  3/12/2023 0613 by Noemi Bradford RN  Outcome: Progressing  Goal: Absence of infection during hospitalization  3/12/2023 0838 by Nathan Gao RN  Outcome: Completed  3/12/2023 06 by Noemi Bradford RN  Outcome: Progressing  Goal: Absence of fever/infection during anticipated neutropenic period  3/12/2023 08 by Nathan Gao RN  Outcome: Completed  3/12/2023 0613 by Noemi Bradford RN  Outcome: Progressing     Problem: Safety - Adult  Goal: Free from fall injury  3/12/2023 0838 by Owen Turner NISH [place]  Outcome: Completed  3/12/2023 4824 by Yvonne Sanchez RN  Outcome: Progressing     Problem: Discharge Planning  Goal: Discharge to home or other facility with appropriate resources  3/12/2023 0838 by Miller Apley, RN  Outcome: Completed  3/12/2023 4273 by Yvonne Sanchez RN  Outcome: Progressing     Problem: Chronic Conditions and Co-morbidities  Goal: Patient's chronic conditions and co-morbidity symptoms are monitored and maintained or improved  3/12/2023 0838 by Miller Apley, RN  Outcome: Completed  3/12/2023 0613 by Yvonne Sanchez RN  Outcome: Progressing

## 2024-02-15 ENCOUNTER — HOSPITAL ENCOUNTER (EMERGENCY)
Age: 29
Discharge: HOME | End: 2024-02-15
Attending: EMERGENCY MEDICINE

## 2024-02-15 ENCOUNTER — APPOINTMENT (OUTPATIENT)
Dept: CT IMAGING | Age: 29
End: 2024-02-15

## 2024-02-15 VITALS
TEMPERATURE: 98.2 F | OXYGEN SATURATION: 95 % | BODY MASS INDEX: 29.16 KG/M2 | WEIGHT: 196.87 LBS | DIASTOLIC BLOOD PRESSURE: 81 MMHG | HEIGHT: 69 IN | RESPIRATION RATE: 16 BRPM | HEART RATE: 70 BPM | SYSTOLIC BLOOD PRESSURE: 112 MMHG

## 2024-02-15 DIAGNOSIS — R10.32 ABDOMINAL CRAMPING IN LEFT LOWER QUADRANT: ICD-10-CM

## 2024-02-15 DIAGNOSIS — N30.00 ACUTE CYSTITIS WITHOUT HEMATURIA: Primary | ICD-10-CM

## 2024-02-15 LAB
ALBUMIN SERPL-MCNC: 4.5 G/DL (ref 3.4–5)
ALBUMIN/GLOB SERPL: 1.7 {RATIO} (ref 1.1–2.2)
ALP SERPL-CCNC: 77 U/L (ref 40–129)
ALT SERPL-CCNC: 14 U/L (ref 10–40)
ANION GAP SERPL CALCULATED.3IONS-SCNC: 12 MMOL/L (ref 3–16)
AST SERPL-CCNC: 17 U/L (ref 15–37)
BACTERIA GENITAL QL WET PREP: NORMAL
BACTERIA URNS QL MICRO: ABNORMAL /HPF
BASOPHILS # BLD: 0.1 K/UL (ref 0–0.2)
BASOPHILS NFR BLD: 0.8 %
BILIRUB SERPL-MCNC: 0.4 MG/DL (ref 0–1)
BILIRUB UR QL STRIP.AUTO: NEGATIVE
BUN SERPL-MCNC: 17 MG/DL (ref 7–20)
CALCIUM SERPL-MCNC: 9.6 MG/DL (ref 8.3–10.6)
CHLORIDE SERPL-SCNC: 104 MMOL/L (ref 99–110)
CLARITY UR: CLEAR
CLUE CELLS SPEC QL WET PREP: NORMAL
CO2 SERPL-SCNC: 25 MMOL/L (ref 21–32)
COLOR UR: YELLOW
CREAT SERPL-MCNC: 0.7 MG/DL (ref 0.6–1.1)
DEPRECATED RDW RBC AUTO: 13.6 % (ref 12.4–15.4)
EOSINOPHIL # BLD: 0.2 K/UL (ref 0–0.6)
EOSINOPHIL NFR BLD: 2.3 %
EPI CELLS #/AREA URNS HPF: ABNORMAL /HPF (ref 0–5)
EPI CELLS SPEC QL WET PREP: NORMAL
GFR SERPLBLD CREATININE-BSD FMLA CKD-EPI: >60 ML/MIN/{1.73_M2}
GLUCOSE SERPL-MCNC: 99 MG/DL (ref 70–99)
GLUCOSE UR STRIP.AUTO-MCNC: NEGATIVE MG/DL
HCG UR QL: NEGATIVE
HCT VFR BLD AUTO: 41.3 % (ref 36–48)
HGB BLD-MCNC: 13.9 G/DL (ref 12–16)
HGB UR QL STRIP.AUTO: ABNORMAL
KETONES UR STRIP.AUTO-MCNC: NEGATIVE MG/DL
LEUKOCYTE ESTERASE UR QL STRIP.AUTO: NEGATIVE
LIPASE SERPL-CCNC: 37 U/L (ref 13–60)
LYMPHOCYTES # BLD: 2.4 K/UL (ref 1–5.1)
LYMPHOCYTES NFR BLD: 25.8 %
MCH RBC QN AUTO: 28.9 PG (ref 26–34)
MCHC RBC AUTO-ENTMCNC: 33.6 G/DL (ref 31–36)
MCV RBC AUTO: 86 FL (ref 80–100)
MONOCYTES # BLD: 0.8 K/UL (ref 0–1.3)
MONOCYTES NFR BLD: 8.2 %
NEUTROPHILS # BLD: 5.8 K/UL (ref 1.7–7.7)
NEUTROPHILS NFR BLD: 62.9 %
NITRITE UR QL STRIP.AUTO: POSITIVE
PH UR STRIP.AUTO: 7 [PH] (ref 5–8)
PLATELET # BLD AUTO: 265 K/UL (ref 135–450)
PMV BLD AUTO: 9.1 FL (ref 5–10.5)
POTASSIUM SERPL-SCNC: 3.6 MMOL/L (ref 3.5–5.1)
PROT SERPL-MCNC: 7.2 G/DL (ref 6.4–8.2)
PROT UR STRIP.AUTO-MCNC: ABNORMAL MG/DL
RBC # BLD AUTO: 4.81 M/UL (ref 4–5.2)
RBC #/AREA URNS HPF: ABNORMAL /HPF (ref 0–4)
RBC SPEC QL WET PREP: NORMAL
SODIUM SERPL-SCNC: 141 MMOL/L (ref 136–145)
SP GR UR STRIP.AUTO: 1.01 (ref 1–1.03)
SPECIMEN SOURCE FLD: NORMAL
T VAGINALIS GENITAL QL WET PREP: NORMAL
UA COMPLETE W REFLEX CULTURE PNL UR: ABNORMAL
UA DIPSTICK W REFLEX MICRO PNL UR: YES
URN SPEC COLLECT METH UR: ABNORMAL
UROBILINOGEN UR STRIP-ACNC: 1 E.U./DL
WBC # BLD AUTO: 9.2 K/UL (ref 4–11)
WBC #/AREA URNS HPF: ABNORMAL /HPF (ref 0–5)
YEAST GENITAL QL WET PREP: NORMAL

## 2024-02-15 PROCEDURE — 87491 CHLMYD TRACH DNA AMP PROBE: CPT

## 2024-02-15 PROCEDURE — 96374 THER/PROPH/DIAG INJ IV PUSH: CPT

## 2024-02-15 PROCEDURE — 83690 ASSAY OF LIPASE: CPT

## 2024-02-15 PROCEDURE — 80053 COMPREHEN METABOLIC PANEL: CPT

## 2024-02-15 PROCEDURE — 87210 SMEAR WET MOUNT SALINE/INK: CPT

## 2024-02-15 PROCEDURE — 74177 CT ABD & PELVIS W/CONTRAST: CPT

## 2024-02-15 PROCEDURE — 6360000004 HC RX CONTRAST MEDICATION: Performed by: EMERGENCY MEDICINE

## 2024-02-15 PROCEDURE — 36415 COLL VENOUS BLD VENIPUNCTURE: CPT

## 2024-02-15 PROCEDURE — 87591 N.GONORRHOEAE DNA AMP PROB: CPT

## 2024-02-15 PROCEDURE — 81001 URINALYSIS AUTO W/SCOPE: CPT

## 2024-02-15 PROCEDURE — 6360000002 HC RX W HCPCS: Performed by: EMERGENCY MEDICINE

## 2024-02-15 PROCEDURE — 84703 CHORIONIC GONADOTROPIN ASSAY: CPT

## 2024-02-15 PROCEDURE — 85025 COMPLETE CBC W/AUTO DIFF WBC: CPT

## 2024-02-15 PROCEDURE — 99285 EMERGENCY DEPT VISIT HI MDM: CPT

## 2024-02-15 RX ORDER — IBUPROFEN 600 MG/1
600 TABLET ORAL EVERY 6 HOURS PRN
Qty: 40 TABLET | Refills: 0 | Status: SHIPPED | OUTPATIENT
Start: 2024-02-15

## 2024-02-15 RX ORDER — KETOROLAC TROMETHAMINE 30 MG/ML
30 INJECTION, SOLUTION INTRAMUSCULAR; INTRAVENOUS ONCE
Status: COMPLETED | OUTPATIENT
Start: 2024-02-15 | End: 2024-02-15

## 2024-02-15 RX ADMIN — IOMEPROL INJECTION 100 ML: 714 INJECTION, SOLUTION INTRAVASCULAR at 20:34

## 2024-02-15 RX ADMIN — KETOROLAC TROMETHAMINE 30 MG: 30 INJECTION INTRAMUSCULAR; INTRAVENOUS at 20:54

## 2024-02-16 NOTE — DISCHARGE INSTRUCTIONS
Call today or tomorrow to follow up with primary care physician referral from the emergency room in 4-5 days.    Take your medication as indicated, if you are given an antibiotic then make sure you get the prescription filled and take the antibiotics until finished.  Drink plenty of fluids while taking the antibiotics.  Avoid drinking alcohol while taking antibiotics.     Use ibuprofen or Tylenol (unless prescribed medications that have Tylenol in it) for pain.  You can take over the counter Ibuprofen (advil) tablets (4 every 8 hours or 3 every 6 hours or 2 every 4 hours)    [employer], [employer] has some antibiotics for free; [facility] and [facility] has a 4 dollar prescription plan for some antibiotics.    Return to the Emergency Department for fever > 101.5, inability to urinate, burning when you urinate, increase in the number of times or if you have an urgency to urinate, any other care or concern.

## 2024-02-16 NOTE — ED PROVIDER NOTES
still having some cramping from her UTI but her infection has been improving with the antibiotic she is on currently. [DS]   2139 CT abdomen pelvis unremarkable.  Patient updated.  Patient resting comfortably on reassessment.  States she is feeling better after the Toradol.  No longer having any cramping.  Discussed that she is likely having cramping still from the UTI especially with there being resistance to the initial antibiotic [DS]      ED Course User Index  [DS] Guzman Renteria MD       I estimate there is LOW risk for ACUTE APPENDICITIS, BOWEL OBSTRUCTION, CHOLECYSTITIS, DIVERTICULITIS, INCARCERATED HERNIA, PANCREATITIS, PELVIC INFLAMMATORY DISEASE, PERFORATED BOWEL or ULCER, PREGNANCY, or TUBO-OVARIAN ABSCESS, thus I consider the discharge disposition reasonable. Also, there is no evidence or peritonitis, sepsis, or toxicity. Lydia Cheney and I have discussed the diagnosis and risks, and we agree with discharging home to follow-up with their primary doctor. We also discussed returning to the Emergency Department immediately if new or worsening symptoms occur. We have discussed the symptoms which are most concerning (e.g., bloody stool, fever, changing or worsening pain, vomiting) that necessitate immediate return.         CONSULTS: None   Social Determinants: None   Chronic Conditions:  has a past medical history of Anemia.    Disposition Considerations: None      I am the primary physician of Record.     FINAL IMPRESSION    1. Acute cystitis without hematuria    2. Abdominal cramping in left lower quadrant         DISPOSITION/PLAN   DISPOSITION Decision To Discharge 02/15/2024 09:43:34 PM       PATIENT REFERRED TO:   [facility]  [phone]         DISCHARGE MEDICATIONS:   New Prescriptions    IBUPROFEN (ADVIL;MOTRIN) 600 MG TABLET    Take 1 tablet by mouth every 6 hours as needed for Pain      DISCONTINUED MEDICATIONS:   Discontinued Medications    No medications on file

## 2024-02-16 NOTE — ED TRIAGE NOTES
Pt presents with 2 weeks of LLQ pain. She was having diarrhea which has since resolved. She denies other symptoms.

## 2024-02-20 LAB
C TRACH DNA CVX QL NAA+PROBE: NEGATIVE
N GONORRHOEA DNA CERV MUCUS QL NAA+PROBE: NEGATIVE